# Patient Record
Sex: FEMALE | Race: NATIVE HAWAIIAN OR OTHER PACIFIC ISLANDER
[De-identification: names, ages, dates, MRNs, and addresses within clinical notes are randomized per-mention and may not be internally consistent; named-entity substitution may affect disease eponyms.]

---

## 2018-07-24 ENCOUNTER — HOSPITAL ENCOUNTER (INPATIENT)
Dept: HOSPITAL 31 - C.ER | Age: 83
LOS: 3 days | Discharge: HOME | DRG: 552 | End: 2018-07-27
Attending: INTERNAL MEDICINE | Admitting: INTERNAL MEDICINE
Payer: COMMERCIAL

## 2018-07-24 VITALS — RESPIRATION RATE: 20 BRPM

## 2018-07-24 DIAGNOSIS — I10: ICD-10-CM

## 2018-07-24 DIAGNOSIS — M81.0: ICD-10-CM

## 2018-07-24 DIAGNOSIS — M48.061: ICD-10-CM

## 2018-07-24 DIAGNOSIS — M51.36: Primary | ICD-10-CM

## 2018-07-24 DIAGNOSIS — M48.57XA: ICD-10-CM

## 2018-07-24 DIAGNOSIS — E66.9: ICD-10-CM

## 2018-07-24 DIAGNOSIS — E11.69: ICD-10-CM

## 2018-07-24 LAB
ALBUMIN SERPL-MCNC: 3.7 G/DL (ref 3.5–5)
ALBUMIN/GLOB SERPL: 1.2 {RATIO} (ref 1–2.1)
ALT SERPL-CCNC: 27 U/L (ref 9–52)
APTT BLD: 27 SECONDS (ref 21–34)
AST SERPL-CCNC: 29 U/L (ref 14–36)
BACTERIA #/AREA URNS HPF: (no result) /[HPF]
BASOPHILS # BLD AUTO: 0.1 K/UL (ref 0–0.2)
BASOPHILS NFR BLD: 1.4 % (ref 0–2)
BILIRUB UR-MCNC: NEGATIVE MG/DL
BUN SERPL-MCNC: 17 MG/DL (ref 7–17)
CALCIUM SERPL-MCNC: 9.3 MG/DL (ref 8.6–10.4)
EOSINOPHIL # BLD AUTO: 0.2 K/UL (ref 0–0.7)
EOSINOPHIL NFR BLD: 2.3 % (ref 0–4)
ERYTHROCYTE [DISTWIDTH] IN BLOOD BY AUTOMATED COUNT: 12.8 % (ref 11.5–14.5)
GFR NON-AFRICAN AMERICAN: > 60
GLUCOSE UR STRIP-MCNC: (no result) MG/DL
HGB BLD-MCNC: 11.8 G/DL (ref 11–16)
INR PPP: 1.1
LEUKOCYTE ESTERASE UR-ACNC: (no result) LEU/UL
LYMPHOCYTES # BLD AUTO: 1.5 K/UL (ref 1–4.3)
LYMPHOCYTES NFR BLD AUTO: 22.3 % (ref 20–40)
MCH RBC QN AUTO: 29 PG (ref 27–31)
MCHC RBC AUTO-ENTMCNC: 34.2 G/DL (ref 33–37)
MCV RBC AUTO: 84.9 FL (ref 81–99)
MONOCYTES # BLD: 0.5 K/UL (ref 0–0.8)
MONOCYTES NFR BLD: 7.8 % (ref 0–10)
NEUTROPHILS # BLD: 4.4 K/UL (ref 1.8–7)
NEUTROPHILS NFR BLD AUTO: 66.2 % (ref 50–75)
NRBC BLD AUTO-RTO: 0 % (ref 0–2)
PH UR STRIP: 5 [PH] (ref 5–8)
PLATELET # BLD: 173 K/UL (ref 130–400)
PMV BLD AUTO: 9.3 FL (ref 7.2–11.7)
PROT UR STRIP-MCNC: NEGATIVE MG/DL
PROTHROMBIN TIME: 11.5 SECONDS (ref 9.7–12.2)
RBC # BLD AUTO: 4.06 MIL/UL (ref 3.8–5.2)
RBC # UR STRIP: NEGATIVE /UL
SP GR UR STRIP: 1.03 (ref 1–1.03)
SQUAMOUS EPITHIAL: 8 /HPF (ref 0–5)
UROBILINOGEN UR-MCNC: NORMAL MG/DL (ref 0.2–1)
WBC # BLD AUTO: 6.6 K/UL (ref 4.8–10.8)

## 2018-07-24 RX ADMIN — OXYCODONE HYDROCHLORIDE AND ACETAMINOPHEN PRN TAB: 5; 325 TABLET ORAL at 23:50

## 2018-07-24 NOTE — RAD
PROCEDURE:  Radiographs of the pelvis and bilateral hips



HISTORY:

Right hip pain



COMPARISON:

None.



FINDINGS:



BONES:

The pelvic ring is intact.  There is no acute displaced fracture or 

bone destruction.  There is diffuse bone demineralization. 



JOINTS:

The hip joint spaces are preserved. The sacroiliac joints are normal. 



There is mild osteitis pubis. 



SOFT TISSUES:

There soft tissue calcifications superior to the right greater 

trochanter.  There are multiple phleboliths in the pelvis.  AA the 

rounded calcification in the right hemipelvis likely represents a 

calcified fibroid. 



OTHER FINDINGS:

None.



IMPRESSION:

No acute fracture or dislocation. No significant degenerative 

osteoarthrosis.



Mild osteitis pubis.



Soft tissue calcification superior to the right greater trochanter 

may represent tendon calcification.

## 2018-07-24 NOTE — RAD
Date of service: 



07/24/2018



PROCEDURE:  Radiographs of the Lumbar Spine.



HISTORY:

low back pain







COMPARISON:

Image from barium enema 2/4/2013-frontal view only.



FINDINGS:



BONES:

The facet hypertrophic arthrosis and marginal osteophytosis are 

renoted and have progressed at the L5 and L5-S1 level since the  

image. Diffuse thoraco lumbar segmental spondylosis with intervening 

disc space narrowing is present.  The chronicity of this appearance 

is more difficult to assess at least in part some spondylosis at 

these levels were inferred per  image. There is loss of disc 

height any with mostly anterior  wedging loss of height and 

subchondral sclerosis suggested of the L2 vertebrae.  Although some 

decrease in height is suggested on the prior frontal view - the 

degree of disc height loss greater now than before in the mass 

spondylosis and subchondral sclerosis at this level and above also 

appear greater now. 



An interval compression of the cell to with secondary osteoarthrosis 

consideration of this L2 vertebral body - the subchondral sclerosis 

impart suggest an element of chronicity.  Clinical correlation is 

essential. 



DISC SPACES:

Narrowed as above



OTHER FINDINGS:

Bilateral hemipelvic phleboliths.  A 15 mm calcification projects 

over the right hemipelvis not appreciate on the prior study-however 

the prior study did not include this far inferior portion the pelvis 

scalp EEG. A large phlebolith is 1 consideration. A calculus is 

another. Although a gluteal injection granuloma can't excluded this 

is not appreciated in the gluteal soft tissues on this exam.



The vascular calcifications noted 



Moderate Stool retention.



IMPRESSION:

Compression deformity L2 - the narrowing of this vertebral body 

appears greater than what was present previously on the  the E 

from 2013. The inferior endplate sclerosis and osseous hypertrophic 

changes here encroach on the posterior inferior spinal canal on 

lateral view.  No prior lateral views are available to assess for any 

interval change here. More superiorly no retropulsed ossific 

fragments are suggested. Clinical correlation trans of patient's 

current pain is needed. An acute or subacute on chronic pathology 

here is a consideration.



Interval progressive thoracic lumbar and inferior lumbar level 

hypertrophic arthrosis.



Hemipelvic calcifications as above.



 Stool retention.

## 2018-07-24 NOTE — C.PDOC
History Of Present Illness





82 female, presents with low back pain for last 3 weeks. pain to right lower 

back, going down right leg. saw pmd, given pain meds. pt states remote hx of 

fall, a few weeks ago.no saddle ansetheisa, no hematuria, no urinary changes. 


Time Seen by Provider: 07/24/18 09:02


Chief Complaint (Nursing): Back Pain





Past Medical History


Reviewed: Historical Data, Nursing Documentation, Vital Signs


Vital Signs: 


 Last Vital Signs











Temp  97.8 F   07/24/18 15:48


 


Pulse  76   07/24/18 15:48


 


Resp  20   07/24/18 15:48


 


BP  116/74   07/24/18 15:48


 


Pulse Ox  97   07/24/18 16:27














- Medical History


PMH: HTN, Hypercholesterolemia, Osteoporosis


Family History: States: Unknown Family Hx





- Social History


Hx Alcohol Use: No


Hx Substance Use: No





- Immunization History


Hx Tetanus Toxoid Vaccination:  (unk)


Hx Influenza Vaccination: Yes


Hx Pneumococcal Vaccination:  (unk)





Review Of Systems


Musculoskeletal: Positive for: Back Pain


Neurological: Positive for: Numbness





Physical Exam





- Physical Exam


Appears: Well, No Acute Distress


Skin: Normal Color, Warm, Dry


Eye(s): bilateral: Normal Inspection, PERRL, EOMI


Nose: Normal


Throat: Normal


Neck: Normal


Cardiovascular: Rhythm Regular


Respiratory: Normal Breath Sounds


Gastrointestinal/Abdominal: Normal Exam


Back: Normal Inspection, No Vertebral Tenderness, Paraspinal Tenderness, 

Straight Leg Raising (right)


Extremity: Normal ROM





ED Course And Treatment





- Laboratory Results


Result Diagrams: 


 07/24/18 10:25





 07/24/18 10:25


O2 Sat by Pulse Oximetry: 97 (RA)


Pulse Ox Interpretation: Normal





- Other Rad


  ** XR Lumbar spine


X-Ray: Read By Radiologist


Interpretation: FINDINGS:  BONES:  The facet hypertrophic arthrosis and 

marginal osteophytosis are renoted and have progressed at the L5 and L5-S1 

level since the  image. Diffuse thoraco lumbar segmental spondylosis with 

intervening disc space narrowing is present.  The chronicity of this appearance 

is more difficult to assess at least in part some spondylosis at these levels 

were inferred per  image. There is loss of disc height any with mostly 

anterior  wedging loss of height and subchondral sclerosis suggested of the L2 

vertebrae.  Although some decrease in height is suggested on the prior frontal 

view - the degree of disc height loss greater now than before in the mass 

spondylosis and subchondral sclerosis at this level and above also appear 

greater now.  An interval compression of the cell to with secondary 

osteoarthrosis consideration of this L2 vertebral body - the subchondral 

sclerosis impart suggest an element of chronicity.  Clinical correlation is 

essential.  DISC SPACES:  Narrowed as above.  OTHER FINDINGS:  Bilateral 

hemipelvic phleboliths.  A 15 mm calcification projects over the right 

hemipelvis not appreciate on the prior study-however the prior study did not 

include this far inferior portion the pelvis scalp EEG. A large phlebolith is 1 

consideration. A calculus is another. Although a gluteal injection granuloma can

't excluded this is not appreciated in the gluteal soft tissues on this exam.  

The vascular calcifications noted.  Moderate Stool retention.  IMPRESSION:  

Compression deformity L2 - the narrowing of this vertebral body appears greater 

than what was present previously on the  the E from 2013. The inferior 

endplate sclerosis and osseous hypertrophic changes here encroach on the 

posterior inferior spinal canal on lateral view.  No prior lateral views are 

available to assess for any interval change here. More superiorly no 

retropulsed ossific fragments are suggested. Clinical correlation trans of 

patient's current pain is needed. An acute or subacute on chronic pathology 

here is a consideration.  Interval progressive thoracic lumbar and inferior 

lumbar level hypertrophic arthrosis.  Hemipelvic calcifications as above.  

Stool retention.





Medical Decision Making


Medical Decision Making: 





suspect sciatica. 





1113


XR reviewed, shows compression fracture.


Plan for admission discussed with patient and she is requesting to be admitted 

under Dr. Pruett


Case discussed with Dr. Pruett, who accepts patient under her service. 





Disposition





- Disposition


Disposition: HOSPITALIZED


Disposition Time: 01:00


Condition: STABLE





- Clinical Impression


Clinical Impression: 


 Compression fracture, Low back pain








Decision To Admit





- Pt Status Changed To:


Hospital Disposition Of: Inpatient





- Admit Certification


Admit to Inpatient:: After my assessment, the patient will require 

hospitalization for at least two midnights.  This is because of the severity of 

symptoms shown, intensity of services needed, and/or the medical risk in this 

patient being treated as an outpatient.





- InPatient:


Physician Admission Certification:: needs mri possible kyphoplasty





- .


Bed Request Type: Regular


Admitting Physician: Brenda Pruett


Patient Diagnosis: 


 Compression fracture, Low back pain

## 2018-07-25 RX ADMIN — PREDNISOLONE ACETATE SCH DROP: 10 SUSPENSION/ DROPS OPHTHALMIC at 18:01

## 2018-07-25 RX ADMIN — OXYCODONE HYDROCHLORIDE AND ACETAMINOPHEN PRN TAB: 5; 325 TABLET ORAL at 16:43

## 2018-07-25 RX ADMIN — ROSUVASTATIN CALCIUM SCH MG: 5 TABLET, FILM COATED ORAL at 21:30

## 2018-07-25 RX ADMIN — OXYBUTYNIN CHLORIDE SCH MG: 10 TABLET, EXTENDED RELEASE ORAL at 12:32

## 2018-07-25 RX ADMIN — BRIMONIDINE TARTRATE SCH DROP: 2 SOLUTION/ DROPS OPHTHALMIC at 18:02

## 2018-07-25 RX ADMIN — OXYCODONE HYDROCHLORIDE AND ACETAMINOPHEN PRN TAB: 5; 325 TABLET ORAL at 21:43

## 2018-07-25 RX ADMIN — LATANOPROST SCH ML: 50 SOLUTION OPHTHALMIC at 18:02

## 2018-07-25 RX ADMIN — OXYCODONE HYDROCHLORIDE AND ACETAMINOPHEN PRN TAB: 5; 325 TABLET ORAL at 08:27

## 2018-07-25 RX ADMIN — ENOXAPARIN SODIUM SCH MG: 40 INJECTION SUBCUTANEOUS at 10:33

## 2018-07-25 RX ADMIN — LATANOPROST SCH: 50 SOLUTION OPHTHALMIC at 11:00

## 2018-07-25 RX ADMIN — PREDNISOLONE ACETATE SCH DROP: 10 SUSPENSION/ DROPS OPHTHALMIC at 14:52

## 2018-07-25 NOTE — CP.PCM.PN
Subjective





- Date & Time of Evaluation


Date of Evaluation: 07/25/18


Time of Evaluation: 08:22





- Subjective


Subjective: 





Ortho eval for Dr. Ferrer





82F complains of right sided back pain x 4 weeks with occasional right leg 

pain. She says she has too much pain when she is sitting and worse when she is 

walking. Denies any change in bowel or bladder habits. Denies numbness/

tingling. Patient had fall about 4 weeks ago. No recent trauma or falls. Denies 

groin pain. Pain in leg is down back of leg





Objective





- Vital Signs/Intake and Output


Vital Signs (last 24 hours): 


 











Temp Pulse Resp BP Pulse Ox


 


 97.8 F   76   20   141/77   97 


 


 07/25/18 08:00  07/25/18 08:00  07/25/18 08:00  07/25/18 08:00  07/25/18 08:00











- Medications


Medications: 


 Current Medications





Aspirin (Aspirin Chewable)  81 mg PO DAILY [facility]


Enoxaparin Sodium (Lovenox)  40 mg SC DAILY [facility]


Ergocalciferol (Drisdol 50,000 Intl Units Cap)  1 cap PO QWK [facility]


Glimepiride (Amaryl)  4 mg PO DAILY [facility]


Home Med (Alendronate [Fosamax])  70 mg PO QWK [facility]


Home Med (Brimonidine Tartrate [Alphagan P 0.1 % Ophth])  5 ml OP BID [facility]


Home Med (Trifluoperazine Hcl [Stelazine])  5 mg PO DAILY [facility]


Latanoprost (Xalatan Opht)  0.05 ml OU BID [facility]


Losartan Potassium (Cozaar)  100 mg PO DAILY [facility]


Oxycodone/Acetaminophen (Percocet 5/325 Mg Tab)  1 tab PO Q6H PRN


   PRN Reason: Pain, moderate (4-7)


   Stop: 07/27/18 22:29


   Last Admin: 07/24/18 23:50 Dose:  1 tab


Rosuvastatin Calcium (Crestor)  2.5 mg PO HS [facility]


Sitagliptin Phosphate (Januvia)  50 mg PO BID [facility]











- Labs


Labs: 


 





 07/24/18 10:25 





 07/24/18 10:25 





 











PT  11.5 SECONDS (9.7-12.2)   07/24/18  10:25    


 


INR  1.1   07/24/18  10:25    


 


APTT  27 SECONDS (21-34)   07/24/18  10:25    














- Constitutional


Appears: Well, No Acute Distress (sitting on edge of bed)





- Neck Exam


Neck Exam: Full ROM, Normal Inspection





- Respiratory Exam


Respiratory Exam: NORMAL BREATHING PATTERN





- Cardiovascular Exam


Additional comments: 





+DP/PT pulses





- Extremities Exam


Additional comments: 





calves soft NT neg homans


sensation intact BLE


1+patellar reflexes B





- Back Exam


Additional comments: 





tenderness to right gluteal area


no bony tenderness





- Neurological Exam


Neurological Exam: Alert, Awake


Neuro motor strength exam: Left Lower Extremity: 5, Right Lower Extremity: 5 (

great toe ext, DF/PF/knee flex/ext)





- Psychiatric Exam


Psychiatric exam: Normal Affect, Normal Mood





- Skin


Skin Exam: Dry, Intact, Normal Color, Warm





Assessment and Plan


(1) Compression fracture of L2


Assessment & Plan: 


MRI lumbar spine


sclerosis noted to L2, unclear if acute or chronic fracture, noted 

spondylolisthesis


noted multilevel facet joint DJD


recommend VTE proph


recommend neurosurgical consult prior to PT/OT to determine if surgical 

indication or if unstable, Dr. Ferrer agrees 


lidoderm patch


d/w Dr. Ferrer, agrees with above


Status: Acute   





(2) Lumbosacral spondylosis with radiculopathy


Status: Acute   





Past Patient History





- Past Medical History & Family History


Past Medical History?: Yes





- Past Social History


Smoking Status: Never Smoked





- CARDIAC


Hx Cardiac Disorders: Yes


Hx Hypercholesterolemia: Yes


Hx Hypertension: Yes





- ENDOCRINE/METABOLIC


Hx Endocrine Disorders: Yes


Hx Diabetes Mellitus Type 2: Yes





- MUSCULOSKELETAL/RHEUMATOLOGICAL


Hx Musculoskeletal Disorders: Yes


Hx Falls: Yes (slip from chair to floor 3 mo. ago)


Hx Osteoporosis: Yes





- PSYCHIATRIC


Hx Psychophysiologic Disorder: No


Hx Substance Use: No





- SURGICAL HISTORY


Hx Surgeries: No





- ANESTHESIA


Hx Anesthesia: No





Radiology Interpretation





- Radiology Interpretation #2


Interpretation: 





Patient Name / ID : ZURI CHRISTIE K  / [id number]


Exam Date : 07/24/2018 09:19:28 ( Approved )


Study Comment : 


Sex / Age : F  / 82Y





Creator : Caldwell-Lombardi, Kathleen


Dictator : Caldwell-Lombardi, Kathleen


 : 


Approver : Caldwell-Lombardi, Kathleen


Approver2 : 





Report Date : 07/24/2018 10:26:20


My Comment : 


********************************************************************************

***





Date of service: 





07/24/2018





PROCEDURE:  Radiographs of the Lumbar Spine.





HISTORY:


low back pain











COMPARISON:


Image from barium enema 2/4/2013-frontal view only.





FINDINGS:





BONES:


The facet hypertrophic arthrosis and marginal osteophytosis are renoted and 

have progressed at the L5 and L5-S1 level since the  image. Diffuse 

thoraco lumbar segmental spondylosis with intervening disc space narrowing is 

present.  The chronicity of this appearance is more difficult to assess at 

least in part some spondylosis at these levels were inferred per  image. 

There is loss of disc height any with mostly anterior  wedging loss of height 

and subchondral sclerosis suggested of the L2 vertebrae.  Although some 

decrease in height is suggested on the prior frontal view - the degree of disc 

height loss greater now than before in the mass spondylosis and subchondral 

sclerosis at this level and above also appear greater now. 





An interval compression of the cell to with secondary osteoarthrosis 

consideration of this L2 vertebral body - the subchondral sclerosis impart 

suggest an element of chronicity.  Clinical correlation is essential. 





DISC SPACES:


Narrowed as above





OTHER FINDINGS:


Bilateral hemipelvic phleboliths.  A 15 mm calcification projects over the 

right hemipelvis not appreciate on the prior study-however the prior study did 

not include this far inferior portion the pelvis scalp EEG. A large phlebolith 

is 1 consideration. A calculus is another. Although a gluteal injection 

granuloma can't excluded this is not appreciated in the gluteal soft tissues on 

this exam.





The vascular calcifications noted 





Moderate Stool retention.





IMPRESSION:


Compression deformity L2 - the narrowing of this vertebral body appears greater 

than what was present previously on the  the E from 2013. The inferior 

endplate sclerosis and osseous hypertrophic changes here encroach on the 

posterior inferior spinal canal on lateral view.  No prior lateral views are 

available to assess for any interval change here. More superiorly no 

retropulsed ossific fragments are suggested. Clinical correlation trans of 

patient's current pain is needed. An acute or subacute on chronic pathology 

here is a consideration.





Interval progressive thoracic lumbar and inferior lumbar level hypertrophic 

arthrosis.





Hemipelvic calcifications as above.





 Stool retention.




















- Radiology Interpretation #3


Interpretation: 





Patient Name / ID : ZURI CHRISTIE K  / [id number]


Exam Date : 07/24/2018 11:41:45 ( Approved )


Study Comment : 


Sex / Age : F  / 82Y





Creator : Jennifer Peterson MD


Dictator : Jennifer Peterson MD


 : 


Approver : Jennifer Peterson MD


Approver2 : 





Report Date : 07/24/2018 12:45:23


My Comment : 


********************************************************************************

***





PROCEDURE:  Radiographs of the pelvis and bilateral hips





HISTORY:


Right hip pain





COMPARISON:


None.





FINDINGS:





BONES:


The pelvic ring is intact.  There is no acute displaced fracture or bone 

destruction.  There is diffuse bone demineralization. 





JOINTS:


The hip joint spaces are preserved. The sacroiliac joints are normal. 





There is mild osteitis pubis. 





SOFT TISSUES:


There soft tissue calcifications superior to the right greater trochanter.  

There are multiple phleboliths in the pelvis.  AA the rounded calcification in 

the right hemipelvis likely represents a calcified fibroid. 





OTHER FINDINGS:


None.





IMPRESSION:


No acute fracture or dislocation. No significant degenerative osteoarthrosis.





Mild osteitis pubis.





Soft tissue calcification superior to the right greater trochanter may 

represent tendon calcification.

## 2018-07-25 NOTE — MRI
Date of service: 



07/25/2018



PROCEDURE:  MR LUMBAR SPINE WITHOUT CONTRAST



HISTORY:

L2 compression fx, LBP r/o RLE radiculopathy



COMPARISON:

None available. 



TECHNIQUE:

Multiecho multiplanar sequences were performed through the lumbar 

spine without the use of intravenous contrast.



FINDINGS:

Normal lumbar curvature is mildly interrupted by a grade 1 

spondylolisthesis at L2-3 apparently on the basis of bilateral L3 

spondylolysis. There is also moderate anterior wedging of the L2 

vertebral body without edema indicating chronic fracture here. No 

suspicious matter signal changes are seen throughout the exam. 



Gross diffuse desiccation is seen throughout the intervertebral discs 

including those at the visualized inferior thoracic spine with conus 

medullaris appears normal in signal terminating at L1 with 

prevertebral paraspinal soft tissues appearing diffusely 

unremarkable. 



T12-L1:

No disc herniation, spinal canal stenosis or neural foraminal 

narrowing. 



L1-2:

No disc herniation, spinal canal stenosis or neural foraminal 

narrowing. 



L2-3:

No disc herniation.  Spondylolisthesis and facet joint arthropathy 

results in mild central canal stenosis and severe left neural 

foraminal stenosis with mild-to-moderate right neural foraminal 

stenosis present. Asymmetry in spondylolisthesis results in asymmetry 

in foraminal stenosis. 



L3-4:

No disc herniation, spinal canal stenosis or neural foraminal 

narrowing. A small right facet joint synovial cyst encroaches right 

dorsal nerve roots minimally. 



L4-5:

No disc herniation, spinal canal stenosis or neural foraminal 

narrowing. 



L5-S1:

A minimal generalized disc bulge combines with gross facet joint 

degenerative change resulting in severe central canal stenosis and 

borderline bilateral neural foraminal stenosis. No disc herniation. 



OTHER FINDINGS:

None. 



IMPRESSION:

1. Chronic moderate L2 compression fracture. 



2. Grade 1 spondylolisthesis L2-3 appears on the basis of L3 

bilateral spondylolysis. A mild central canal stenosis results with 

severe left and mild-to-moderate right degenerative neural foraminal 

stenosis. No definitive disc herniation. 



3. Severe degenerative central canal stenosis L5-S1.

## 2018-07-25 NOTE — CP.PCM.PN
Subjective





- Date & Time of Evaluation


Date of Evaluation: 07/25/18


Time of Evaluation: 10:49





- Subjective


Subjective: 





still has alot of pain back had mri don today will need neurosurgery evaluation





Objective





- Vital Signs/Intake and Output


Vital Signs (last 24 hours): 


 











Temp Pulse Resp BP Pulse Ox


 


 97.8 F   76   20   141/77   97 


 


 07/25/18 08:00  07/25/18 08:00  07/25/18 08:00  07/25/18 08:00  07/25/18 08:00











- Medications


Medications: 


 Current Medications





Aspirin (Aspirin Chewable)  81 mg PO DAILY [facility]


   Last Admin: 07/25/18 10:32 Dose:  81 mg


Enoxaparin Sodium (Lovenox)  40 mg SC DAILY [facility]


   Last Admin: 07/25/18 10:33 Dose:  40 mg


Ergocalciferol (Drisdol 50,000 Intl Units Cap)  1 cap PO QWK [facility]


   Last Admin: 07/25/18 10:37 Dose:  1 cap


Glimepiride (Amaryl)  4 mg PO DAILY [facility]


   Last Admin: 07/25/18 10:33 Dose:  4 mg


Home Med (Alendronate [Fosamax])  70 mg PO QWK [facility]


Home Med (Brimonidine Tartrate [Alphagan P 0.1 % Ophth])  5 ml OP BID [facility]


Home Med (Trifluoperazine Hcl [Stelazine])  5 mg PO DAILY [facility]


Home Med (Trihexyphenidyl Hcl [Trihexyphenidyl Hcl])  5 mg PO DAILY [facility]


Latanoprost (Xalatan Opht)  0.05 ml OU BID [facility]


Lidocaine (Lidoderm)  1 ea TD DAILY [facility]


   Last Admin: 07/25/18 10:33 Dose:  1 ea


Losartan Potassium (Cozaar)  100 mg PO DAILY [facility]


   Last Admin: 07/25/18 10:32 Dose:  100 mg


Oxybutynin Chloride (Ditropan Xl)  10 mg PO DAILY [facility]


Oxycodone/Acetaminophen (Percocet 5/325 Mg Tab)  1 tab PO Q6H PRN


   PRN Reason: Pain, moderate (4-7)


   Stop: 07/27/18 22:29


   Last Admin: 07/25/18 08:27 Dose:  1 tab


Rosuvastatin Calcium (Crestor)  2.5 mg PO HS [facility]


Sitagliptin Phosphate (Januvia)  50 mg PO BID [facility]


   Last Admin: 07/25/18 10:33 Dose:  50 mg











- Labs


Labs: 


 





 07/24/18 10:25 





 07/24/18 10:25 





 











PT  11.5 SECONDS (9.7-12.2)   07/24/18  10:25    


 


INR  1.1   07/24/18  10:25    


 


APTT  27 SECONDS (21-34)   07/24/18  10:25    














- Constitutional


Appears: Non-toxic





- Head Exam


Head Exam: NORMAL INSPECTION





- Eye Exam


Eye Exam: Normal appearance


Pupil Exam: NORMAL ACCOMODATION





- ENT Exam


ENT Exam: Mucous Membranes Moist





- Respiratory Exam


Respiratory Exam: Clear to Ausculation Bilateral





- Cardiovascular Exam


Cardiovascular Exam: REGULAR RHYTHM





- GI/Abdominal Exam


GI & Abdominal Exam: Normal Bowel Sounds





- Extremities Exam


Extremities Exam: Normal Inspection





- Back Exam


Back Exam: CVA tenderness (L), CVA tenderness (R), tenderness





- Neurological Exam


Neurological Exam: Alert, Awake, Normal Gait, Oriented x3





- Psychiatric Exam


Psychiatric exam: Normal Affect





- Skin


Skin Exam: Normal Color





Assessment and Plan





- Assessment and Plan (Free Text)


Assessment: 





severe back pain compresion fx  


Plan: 





as per orthopedic need neuro surgery evaluation

## 2018-07-26 RX ADMIN — PREDNISOLONE ACETATE SCH DROP: 10 SUSPENSION/ DROPS OPHTHALMIC at 09:58

## 2018-07-26 RX ADMIN — OXYBUTYNIN CHLORIDE SCH MG: 10 TABLET, EXTENDED RELEASE ORAL at 09:57

## 2018-07-26 RX ADMIN — LATANOPROST SCH ML: 50 SOLUTION OPHTHALMIC at 09:58

## 2018-07-26 RX ADMIN — PREDNISOLONE ACETATE SCH DROP: 10 SUSPENSION/ DROPS OPHTHALMIC at 18:26

## 2018-07-26 RX ADMIN — OXYCODONE HYDROCHLORIDE AND ACETAMINOPHEN PRN TAB: 5; 325 TABLET ORAL at 14:48

## 2018-07-26 RX ADMIN — BRIMONIDINE TARTRATE SCH DROP: 2 SOLUTION/ DROPS OPHTHALMIC at 09:59

## 2018-07-26 RX ADMIN — PREDNISOLONE ACETATE SCH DROP: 10 SUSPENSION/ DROPS OPHTHALMIC at 14:07

## 2018-07-26 RX ADMIN — OXYCODONE HYDROCHLORIDE AND ACETAMINOPHEN PRN TAB: 5; 325 TABLET ORAL at 04:43

## 2018-07-26 RX ADMIN — LATANOPROST SCH ML: 50 SOLUTION OPHTHALMIC at 18:26

## 2018-07-26 RX ADMIN — BRIMONIDINE TARTRATE SCH DROP: 2 SOLUTION/ DROPS OPHTHALMIC at 18:26

## 2018-07-26 RX ADMIN — ENOXAPARIN SODIUM SCH MG: 40 INJECTION SUBCUTANEOUS at 09:56

## 2018-07-26 RX ADMIN — ROSUVASTATIN CALCIUM SCH MG: 5 TABLET, FILM COATED ORAL at 21:30

## 2018-07-26 NOTE — CP.PCM.PN
Subjective





- Date & Time of Evaluation


Date of Evaluation: 07/26/18


Time of Evaluation: 13:55





- Subjective


Subjective: 





Patient states her back pain is getting better. She says she still has pain





Review of Systems





- Review of Systems


All systems: reviewed and no additional remarkable complaints except





- Cardiovascular


Cardiovascular: UNREMARKABLE





- Respiratory


Respiratory: UNREMARKABLE





- Musculoskeletal


Musculoskeletal: As Par HPI





- Integumentary


Integumentary: UNREMARKABLE





- Neurological


Neurological: UNREMARKABLE





- Hematologic/Lymphatic


Hematologic: UNREMARKABLE





Objective





- Vital Signs/Intake and Output


Vital Signs (last 24 hours): 


 











Temp Pulse Resp BP Pulse Ox


 


 97.9 F   87   20   121/78   95 


 


 07/26/18 07:28  07/26/18 07:28  07/26/18 07:28  07/26/18 07:28  07/26/18 07:28








Intake and Output: 


 











 07/26/18 07/26/18





 06:59 18:59


 


Intake Total 680 


 


Balance 680 














- Medications


Medications: 


 Current Medications





Aspirin (Aspirin Chewable)  81 mg PO DAILY [facility]


   Last Admin: 07/26/18 09:56 Dose:  81 mg


Brimonidine Tartrate (Alphagan 0.2% Opht)  0 ml OU BID [facility]


   Last Admin: 07/26/18 09:59 Dose:  1 drop


Enoxaparin Sodium (Lovenox)  40 mg SC DAILY [facility]


   Last Admin: 07/26/18 09:56 Dose:  40 mg


Ergocalciferol (Drisdol 50,000 Intl Units Cap)  1 cap PO QWK [facility]


   Last Admin: 07/25/18 10:37 Dose:  1 cap


Glimepiride (Amaryl)  4 mg PO DAILY [facility]


   Last Admin: 07/26/18 09:56 Dose:  4 mg


Home Med (Alendronate [Fosamax])  70 mg PO QWK [facility]


Home Med (Trifluoperazine Hcl [Stelazine])  5 mg PO DAILY [facility]


Latanoprost (Xalatan Opht)  0 ml OU BID [facility]


   Last Admin: 07/26/18 09:58 Dose:  2.5 ml


Lidocaine (Lidoderm)  1 ea TD DAILY [facility]


   Last Admin: 07/26/18 09:58 Dose:  1 ea


Losartan Potassium (Cozaar)  100 mg PO DAILY [facility]


   Last Admin: 07/26/18 09:57 Dose:  100 mg


Oxybutynin Chloride (Ditropan Xl)  10 mg PO DAILY [facility]


   Last Admin: 07/26/18 09:57 Dose:  10 mg


Oxycodone/Acetaminophen (Percocet 5/325 Mg Tab)  1 tab PO Q6H PRN


   PRN Reason: Pain, moderate (4-7)


   Stop: 07/27/18 22:29


   Last Admin: 07/26/18 04:43 Dose:  1 tab


Prednisolone Acetate (Pred Forte 1% Opht Susp)  0 ml OS TID [facility]


   Last Admin: 07/26/18 09:58 Dose:  1 drop


Rosuvastatin Calcium (Crestor)  2.5 mg PO HS [facility]


   Last Admin: 07/25/18 21:30 Dose:  2.5 mg


Sitagliptin Phosphate (Januvia)  50 mg PO BID [facility]


   Last Admin: 07/26/18 09:57 Dose:  50 mg


Trihexyphenidyl HCl (Artane)  5 mg PO DAILY [facility]











- Labs


Labs: 


 





 07/24/18 10:25 





 07/24/18 10:25 





 











PT  11.5 SECONDS (9.7-12.2)   07/24/18  10:25    


 


INR  1.1   07/24/18  10:25    


 


APTT  27 SECONDS (21-34)   07/24/18  10:25    














- Constitutional


Appears: Well, No Acute Distress (sitting on edge of bed eating)





- Extremities Exam


Additional comments: 





sensation intact BLE


 to R gluteal area





- Neurological Exam


Neurological Exam: Alert, Awake, Oriented x3


Neuro motor strength exam: Left Lower Extremity: 5, Right Lower Extremity: 5





- Psychiatric Exam


Psychiatric exam: Normal Affect, Normal Mood





- Skin


Skin Exam: Dry, Intact, Normal Color, Warm





Assessment and Plan


(1) Compression fracture of L2


Assessment & Plan: 


spine consult pending


no orthopedic intervention indicated


PT note appreciated


d/w Dr. Ferrer, agrees with above


Status: Acute   





(2) Lumbosacral spondylosis with radiculopathy


Status: Acute

## 2018-07-26 NOTE — CP.PCM.PN
Subjective





- Date & Time of Evaluation


Date of Evaluation: 07/26/18


Time of Evaluation: 17:07





- Subjective


Subjective: 





pt still has paoin back seen today by nerosurgery ct don severe disc herniation





Objective





- Vital Signs/Intake and Output


Vital Signs (last 24 hours): 


 











Temp Pulse Resp BP Pulse Ox


 


 97.6 F   78   20   108/68   97 


 


 07/26/18 15:54  07/26/18 15:54  07/26/18 15:54  07/26/18 15:54  07/26/18 15:54








Intake and Output: 


 











 07/26/18 07/26/18





 06:59 18:59


 


Intake Total 680 300


 


Balance 680 300














- Medications


Medications: 


 Current Medications





Aspirin (Aspirin Chewable)  81 mg PO DAILY [facility]


   Last Admin: 07/26/18 09:56 Dose:  81 mg


Brimonidine Tartrate (Alphagan 0.2% Opht)  0 ml OU BID [facility]


   Last Admin: 07/26/18 09:59 Dose:  1 drop


Enoxaparin Sodium (Lovenox)  40 mg SC DAILY [facility]


   Last Admin: 07/26/18 09:56 Dose:  40 mg


Ergocalciferol (Drisdol 50,000 Intl Units Cap)  1 cap PO QWK [facility]


   Last Admin: 07/25/18 10:37 Dose:  1 cap


Glimepiride (Amaryl)  4 mg PO DAILY [facility]


   Last Admin: 07/26/18 09:56 Dose:  4 mg


Home Med (Alendronate [Fosamax])  70 mg PO QWK [facility]


Home Med (Trifluoperazine Hcl [Stelazine])  5 mg PO DAILY [facility]


Latanoprost (Xalatan Opht)  0 ml OU BID [facility]


   Last Admin: 07/26/18 09:58 Dose:  2.5 ml


Lidocaine (Lidoderm)  1 ea TD DAILY [facility]


   Last Admin: 07/26/18 09:58 Dose:  1 ea


Losartan Potassium (Cozaar)  100 mg PO DAILY [facility]


   Last Admin: 07/26/18 09:57 Dose:  100 mg


Oxybutynin Chloride (Ditropan Xl)  10 mg PO DAILY [facility]


   Last Admin: 07/26/18 09:57 Dose:  10 mg


Oxycodone/Acetaminophen (Percocet 5/325 Mg Tab)  1 tab PO Q6H PRN


   PRN Reason: Pain, moderate (4-7)


   Stop: 07/27/18 22:29


   Last Admin: 07/26/18 14:48 Dose:  1 tab


Prednisolone Acetate (Pred Forte 1% Opht Susp)  0 ml OS TID [facility]


   Last Admin: 07/26/18 14:07 Dose:  1 drop


Rosuvastatin Calcium (Crestor)  2.5 mg PO HS [facility]


   Last Admin: 07/25/18 21:30 Dose:  2.5 mg


Sitagliptin Phosphate (Januvia)  50 mg PO BID [facility]


   Last Admin: 07/26/18 09:57 Dose:  50 mg


Trihexyphenidyl HCl (Artane)  5 mg PO DAILY [facility]











- Labs


Labs: 


 





 07/24/18 10:25 





 07/24/18 10:25 





 











PT  11.5 SECONDS (9.7-12.2)   07/24/18  10:25    


 


INR  1.1   07/24/18  10:25    


 


APTT  27 SECONDS (21-34)   07/24/18  10:25    














- Constitutional


Appears: Non-toxic





- Head Exam


Head Exam: NORMAL INSPECTION





- Eye Exam


Eye Exam: Normal appearance


Pupil Exam: NORMAL ACCOMODATION





- ENT Exam


ENT Exam: Mucous Membranes Moist





- Neck Exam


Neck Exam: Full ROM





- Respiratory Exam


Respiratory Exam: Clear to Ausculation Bilateral





- Cardiovascular Exam


Cardiovascular Exam: REGULAR RHYTHM





- GI/Abdominal Exam


GI & Abdominal Exam: Normal Bowel Sounds





- Rectal Exam


Rectal Exam: Deferred





- Extremities Exam


Extremities Exam: Normal Capillary Refill





- Back Exam


Back Exam: CVA tenderness (L)





- Neurological Exam


Neurological Exam: Abnormal Gait, Alert, Oriented x3





- Psychiatric Exam


Psychiatric exam: Normal Affect





- Skin


Skin Exam: Normal Color





Assessment and Plan





- Assessment and Plan (Free Text)


Assessment: 





severe back pain disc herniation spinal stenosis


Plan: 





will dicuss with neurosurgean if any surgery needed will keep pt if not 

willdischarge with pt

## 2018-07-26 NOTE — CT
Date of service: 



07/26/2018



PROCEDURE:  CT Lumbar Spine without contrast



HISTORY:

Old fx L2 with retrolisthesis



COMPARISON:

MRI lumbar spine from 07/25/2018.



TECHNIQUE:

Axial computed tomography images were obtained of the lumbar spine 

without the use of intravenous contrast. Coronal and sagittal 

reformatted images were created and reviewed. 



Radiation dose:



Total exam DLP = 2010.32 mGy-cm.



This CT exam was performed using one or more of the following dose 

reduction techniques: Automated exposure control, adjustment of the 

mA and/or kV according to patient size, and/or use of iterative 

reconstruction technique.



FINDINGS:



VERTEBRAE:

There is mild dextrocurvature in the lumbar spine. There is diffuse 

bone demineralization. There redemonstration of chronic inferior 

endplate osteoporotic compression fracture deformity in the L2 

vertebral body with approximately 50 percent loss of anterior 

vertebral height and left posterior inferior retropulsion of fracture 

fragment. There is normal lumbar lordosis. There is no acute fracture 

or spondylolysis. 



DISCS/SPINAL CANAL/NEURAL FORAMINA:

Evaluation of the discs and nerve roots of spinal canal is limited on 

noncontrast CT examination. Allowing for this: 



L1-2:    No large disc herniation, neural foraminal or spinal canal 

stenosis.



L2-3:    The retropulsed posterior inferior fragment on the left in 

conjunction with moderate facet arthropathy result in severe left 

neural foraminal narrowing. On the right posterior osteophyte in 

conjunction with mild facet arthropathy contribute to moderate right 

neural foraminal narrowing. No central spinal canal stenosis. 



L3-4:    No large disc herniation, neural foraminal or spinal canal 

stenosis. 



L4-5:    Diffuse posterior disc bulge in conjunction with mild 

ligamentum flavum infolding result in mild spinal canal stenosis.  

Moderate bilateral facet arthropathy contribute to mild right and 

moderate left neural foraminal narrowing.



L5-S1:  Diffuse posterior disc bulge with superimposed right 

posterolateral and foraminal disc protrusions in conjunction with 

severe facet arthropathy result in severe right neural foraminal 

narrowing.  Also noted is mild spinal canal stenosis.



PARASPINAL SOFT TISSUES:

There is mild fatty atrophy of the paraspinous muscles. 



OTHER FINDINGS:

None. 



IMPRESSION:

1. 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 

vertebral body with approximately 50% loss of anterior vertebral 

height and leftward posterior inferior retropulsion of fracture 

fragment. 



2. Multilevel degenerative disc disease, worse at L2-3 with left 

sided posterior inferior retropulsion of fracture fragment which in 

conjunction with moderate facet arthropathy result in severe left 

neural foraminal narrowing. On the right posterior osteophyte in 

conjunction with mild facet arthropathy contribute to moderate right 

neural foraminal narrowing. No central spinal canal stenosis.



3. Additional comments as described above.

## 2018-07-26 NOTE — CON
DATE:  07/25/2018



HISTORY OF PRESENT ILLNESS:  This is an 82-year-old female with complaining

of lower back pain.  The pain radiates into the right lower extremity.  She

gives history of sustaining a fall several months ago.  For the past four

weeks, she has been experiencing more pain.  The initial fall was about

seven months ago.  She does not give any history of weakness or any

problems with the bowel or bladder.  Does not give any history of numbness.



PHYSICAL EXAMINATION:

GENERAL:  Revealed an elderly female with complaints of lower back pain.

MUSCULOSKELETAL:  Tenderness on L4 to S1 noted.  Range of motion of the

back is painful.  Moderate spasm noted.  Bilateral sciatic notch tenderness

noted.  Straight leg raising test is positive on the right side at 35

degrees.  Crossed straight leg raising testing is negative.  Range of

motion of the hip is painless.



DIAGNOSTIC DATA:  X-rays of the lumbar spine reveal evidence of compression

fracture of the L2 vertebra.  Some chronicity is noted in the fracture. 

X-rays of the right hip is within normal limits.  The range of motion of

the hip is painless.  The patient has been sitting in a chair when I

examined her.



She had an MRI of the lumbar spine performed on 07/25/2018.  MRI is

reporting chronic moderate L2 compression fracture and a grade I

spondylolisthesis of L2-L3.  Mild central canal stenosis is also seen. 

Also seen severe degenerative central canal stenosis at L5-S1.



DIAGNOSES:

1.  Degenerative disk disease of the lumbar spine.

2.  Lumbar spinal stenosis.

3.  Compression fracture of the L2 vertebra.



PLAN:  Treatment at this point is symptomatic.  We will start the patient

on physical therapy and pain medication as needed.  The patient was given

followup instructions.





__________________________________________

Mabel Ferrer MD





DD:  07/25/2018 14:58:35

DT:  07/25/2018 15:51:15

Job # [id number]

## 2018-07-27 VITALS — SYSTOLIC BLOOD PRESSURE: 137 MMHG | DIASTOLIC BLOOD PRESSURE: 83 MMHG | HEART RATE: 89 BPM | TEMPERATURE: 98.2 F

## 2018-07-27 VITALS — OXYGEN SATURATION: 96 %

## 2018-07-27 RX ADMIN — PREDNISOLONE ACETATE SCH DROP: 10 SUSPENSION/ DROPS OPHTHALMIC at 13:22

## 2018-07-27 RX ADMIN — ENOXAPARIN SODIUM SCH MG: 40 INJECTION SUBCUTANEOUS at 10:17

## 2018-07-27 RX ADMIN — OXYCODONE HYDROCHLORIDE AND ACETAMINOPHEN PRN TAB: 5; 325 TABLET ORAL at 02:22

## 2018-07-27 RX ADMIN — PREDNISOLONE ACETATE SCH DROP: 10 SUSPENSION/ DROPS OPHTHALMIC at 10:18

## 2018-07-27 RX ADMIN — BRIMONIDINE TARTRATE SCH DROP: 2 SOLUTION/ DROPS OPHTHALMIC at 10:20

## 2018-07-27 RX ADMIN — LATANOPROST SCH ML: 50 SOLUTION OPHTHALMIC at 10:20

## 2018-07-27 RX ADMIN — OXYBUTYNIN CHLORIDE SCH MG: 10 TABLET, EXTENDED RELEASE ORAL at 10:17

## 2018-07-27 NOTE — CON
DATE:  07/26/2018



REASON FOR CONSULTATION:  Evaluation of fracture, L2, and right buttock

pain.



HISTORY OF PRESENT ILLNESS:  The patient is one week shy of being 83 and

states through her family that she had a fall 6 to 7 months ago.  At that

time, she was leaning over in a chair, trying to tie her shoe or do

something and fell forward.  No significant pain at that time.  She states

she has been fully ambulatory.  However a month ago, she began to get pain

in the right buttock.  She denies any pain centrally in her back at all. 

She states occasionally she has symptom in her leg, but basically it is

just the right buttock area that hurts.  No loss of bowel or bladder

control.  She has not had any active treatments for this.  Evidently,

taking ibuprofen had seem to help in the past.  She was admitted because of

her continued pain, and the MRI done during her stay demonstrated an old

fracture of L2.



PAST MEDICAL HISTORY:  Significant for hypertension, hypercholesterolemia,

type 2 diabetes, noninsulin-dependent.



MEDICATIONS:  As listed on the chart.



ALLERGIES:  NO KNOWN ALLERGIES.



PAST SURGICAL HISTORY:  Evidently, she had some surgery done on her right

arm 30 years ago or so.



PHYSICAL EXAMINATION:

GENERAL:  She has no tenderness to palpation throughout the entire lumbar

spine.  She is tender around the right sciatic notch.  Nothing really on

the left side.  She has good rotation of both hips with no complaints of

pain whatsoever.  Negative straight leg raising bilaterally.  She states

her sensation is intact to light touch throughout both legs.  She has

excellent motor strength as well.  Decent distal pulses.



LABORATORY DATA:  Her MRI shows an old fracture of the inferior anterior

part of the L2 body.  She has some retrolisthesis there leading to spinal

stenosis.  She has a fair amount of facet hypertrophy and thickening of the

ligament flavum in the levels below leading to stenotic condition as well. 

However, again, no increased bone marrow as a signal to indicate any acuity

of anything here.



I would like to get a CAT scan just to evaluate if there is any bony

healing or stability at L2-3 level where she has the retrolisthesis. 

Certainly, looking at the MRI, I think she would benefit from at least the

decompressive laminectomy, possible stabilization of that L2-3 level, but

again there is a chance that this is very old and it is hard to say how

long she is being living with this.  Without any significant limitations

and ambulation or leg pains, again, it is hard to make a case to proceed

with surgical intervention at this time.  I am going to order some physical

therapy and see if that can help her.  If they are feeling that she can be

discharged and receive outpatient therapy, that is fine.  We will follow up

with her in the office.  If she fails to improve, then she may be candidate

for 1 or 2 epidural injections to see if that helps quiet things down.  If

none of that works, then we will have another discussion about doing a

laminectomy, but again would just take one step at time.



Thank you for allowing me to participate in the care of your patient.





__________________________________________

Bobby Caceres MD



DD:  07/26/2018 10:08:41

DT:  07/26/2018 10:47:23

Job # [id number]

## 2018-07-27 NOTE — CP.PCM.PN
Subjective





- Date & Time of Evaluation


Date of Evaluation: 07/27/18


Time of Evaluation: 10:06





- Subjective


Subjective: 





still has back pain positive ct and mri no surgery at this time will try med 

and pt 





Objective





- Vital Signs/Intake and Output


Vital Signs (last 24 hours): 


 











Temp Pulse Resp BP Pulse Ox


 


 98.2 F   89   20   137/83   96 


 


 07/27/18 07:35  07/27/18 07:35  07/27/18 07:35  07/27/18 07:35  07/27/18 07:35











- Medications


Medications: 


 Current Medications





Aspirin (Aspirin Chewable)  81 mg PO DAILY [facility]


   Last Admin: 07/26/18 09:56 Dose:  81 mg


Brimonidine Tartrate (Alphagan 0.2% Opht)  0 ml OU BID [facility]


   Last Admin: 07/26/18 18:26 Dose:  1 drop


Chlorpromazine (Thorazine)  50 mg PO DAILY [facility]


Enoxaparin Sodium (Lovenox)  40 mg SC DAILY [facility]


   Last Admin: 07/26/18 09:56 Dose:  40 mg


Ergocalciferol (Drisdol 50,000 Intl Units Cap)  1 cap PO QWK [facility]


   Last Admin: 07/25/18 10:37 Dose:  1 cap


Glimepiride (Amaryl)  4 mg PO DAILY [facility]


   Last Admin: 07/26/18 09:56 Dose:  4 mg


Home Med (Patient's Own Medication)  1 tab PO QWK [facility]


Home Med (Trifluoperazine Hcl [Stelazine])  5 mg PO DAILY [facility]


Home Med (Patient's Own Medication)  1 tab PO DAILY [facility]


Latanoprost (Xalatan Opht)  0 ml OU BID [facility]


   Last Admin: 07/26/18 18:26 Dose:  2.5 ml


Lidocaine (Lidoderm)  1 ea TD DAILY [facility]


   Last Admin: 07/26/18 09:58 Dose:  1 ea


Losartan Potassium (Cozaar)  100 mg PO DAILY [facility]


   Last Admin: 07/26/18 09:57 Dose:  100 mg


Oxybutynin Chloride (Ditropan Xl)  10 mg PO DAILY [facility]


   Last Admin: 07/26/18 09:57 Dose:  10 mg


Oxycodone/Acetaminophen (Percocet 5/325 Mg Tab)  1 tab PO Q6H PRN


   PRN Reason: Pain, moderate (4-7)


   Stop: 07/27/18 22:29


   Last Admin: 07/27/18 02:22 Dose:  1 tab


Prednisolone Acetate (Pred Forte 1% Opht Susp)  0 ml OS TID [facility]


   Last Admin: 07/26/18 18:26 Dose:  1 drop


Rosuvastatin Calcium (Crestor)  2.5 mg PO HS [facility]


   Last Admin: 07/26/18 21:30 Dose:  2.5 mg


Sitagliptin Phosphate (Januvia)  50 mg PO BID [facility]


   Last Admin: 07/26/18 18:25 Dose:  50 mg











- Labs


Labs: 


 





 07/24/18 10:25 





 07/24/18 10:25 





 











PT  11.5 SECONDS (9.7-12.2)   07/24/18  10:25    


 


INR  1.1   07/24/18  10:25    


 


APTT  27 SECONDS (21-34)   07/24/18  10:25    














- Constitutional


Appears: Non-toxic





- Head Exam


Head Exam: NORMAL INSPECTION





- Eye Exam


Eye Exam: Normal appearance


Pupil Exam: NORMAL ACCOMODATION





- ENT Exam


ENT Exam: Mucous Membranes Moist





- Neck Exam


Neck Exam: Tenderness





- Respiratory Exam


Respiratory Exam: Clear to Ausculation Bilateral





- Cardiovascular Exam


Cardiovascular Exam: REGULAR RHYTHM


Additional comments: 





ischemia no new changes no chest pain





- GI/Abdominal Exam


GI & Abdominal Exam: Normal Bowel Sounds





- Rectal Exam


Rectal Exam: Deferred





-  Exam


 Exam: NORMAL INSPECTION





- Extremities Exam


Extremities Exam: Normal Inspection





- Back Exam


Back Exam: CVA tenderness (L), muscle spasm, vertebral tenderness





- Neurological Exam


Neurological Exam: Alert, Normal Gait, Oriented x3





- Psychiatric Exam


Psychiatric exam: Normal Mood





- Skin


Skin Exam: Normal Color





Assessment and Plan





- Assessment and Plan (Free Text)


Assessment: 





back pain disc herniation ls sopine cervical muscle spasm obesity dm 


Plan: 





discharge today with pt at home cont all med f/u in 2 weeks

## 2018-07-27 NOTE — CP.PCM.PN
Subjective





- Date & Time of Evaluation


Date of Evaluation: 07/27/18


Time of Evaluation: 14:00





- Subjective


Subjective: 





Patient states pain is getting a little better. Family at bedside. 


Denies any weakness/tinglng/numbness/CP/SOB/dizziness/loss of bowel/bladder fx





Advised family of Dr. Caceres recommendations and plan. They agree to plan of 

trial of PT and conservative mgmt and follow up. Advised to return to ED for 

any worsening of sx, numbness/tingling/weakness/loss of bowel bladder. 





Review of Systems





- Review of Systems


All systems: reviewed and no additional remarkable complaints except





- Cardiovascular


Cardiovascular: As Per HPI





- Respiratory


Respiratory: UNREMARKABLE





- Genitourinary


Genitourinary: UNREMARKABLE





- Musculoskeletal


Musculoskeletal: As Par HPI





- Integumentary


Integumentary: UNREMARKABLE





- Neurological


Neurological: As Per HPI





- Hematologic/Lymphatic


Hematologic: UNREMARKABLE





Objective





- Vital Signs/Intake and Output


Vital Signs (last 24 hours): 


 











Temp Pulse Resp BP Pulse Ox


 


 98.2 F   89   20   137/83   96 


 


 07/27/18 07:35  07/27/18 07:35  07/27/18 07:35  07/27/18 07:35  07/27/18 07:35











- Labs


Labs: 


 





 07/24/18 10:25 





 07/24/18 10:25 





 











PT  11.5 SECONDS (9.7-12.2)   07/24/18  10:25    


 


INR  1.1   07/24/18  10:25    


 


APTT  27 SECONDS (21-34)   07/24/18  10:25    














- Constitutional


Appears: Well, No Acute Distress





- Head Exam


Head Exam: ATRAUMATIC





- Neck Exam


Neck Exam: Full ROM, Normal Inspection





- Respiratory Exam


Respiratory Exam: NORMAL BREATHING PATTERN





- Cardiovascular Exam


Additional comments: 





+DP/PT pulses





- Back Exam


Back Exam: NORMAL INSPECTION


Additional comments: 





no vertebral tenderness





- Neurological Exam


Neurological Exam: Alert, Awake


Neuro motor strength exam: Left Lower Extremity: 5, Right Lower Extremity: 5





- Psychiatric Exam


Psychiatric exam: Normal Affect, Normal Mood





- Skin


Skin Exam: Dry, Intact, Normal Color, Warm





Assessment and Plan


(1) Compression fracture of L2


Assessment & Plan: 


patient to f/u Dr. Caceres as outpatient


no ortho intervention indicated, defer treatment to spine specialist


PT/OT 


f/u DR. Ferrer prn


d/w Dr. Ferrer, agrees with above


Status: Acute   





(2) Lumbosacral spondylosis with radiculopathy


Status: Acute   





Radiology Interpretation





- Radiology Interpretation #2


Interpretation: 





Patient Name / ID : ZURI CHRISTIE K  / [id number]


Exam Date : 07/26/2018 14:36:19 ( Approved )


Study Comment : 


Sex / Age : F  / 82Y





Creator : Alana Covarrubias


Dictator : Jennifer Peterson MD


 : 


Approver : Jennifre Peterson MD


Approver2 : 





Report Date : 07/26/2018 14:44:24


My Comment : 


********************************************************************************

***





Date of service: 





07/26/2018





PROCEDURE:  CT Lumbar Spine without contrast





HISTORY:


Old fx L2 with retrolisthesis





COMPARISON:


MRI lumbar spine from 07/25/2018.





TECHNIQUE:


Axial computed tomography images were obtained of the lumbar spine without the 

use of intravenous contrast. Coronal and sagittal reformatted images were 

created and reviewed. 





Radiation dose:





Total exam DLP = 2010.32 mGy-cm.





This CT exam was performed using one or more of the following dose reduction 

techniques: Automated exposure control, adjustment of the mA and/or kV 

according to patient size, and/or use of iterative reconstruction technique.





FINDINGS:





VERTEBRAE:


There is mild dextrocurvature in the lumbar spine. There is diffuse bone 

demineralization. There redemonstration of chronic inferior endplate 

osteoporotic compression fracture deformity in the L2 vertebral body with 

approximately 50 percent loss of anterior vertebral height and left posterior 

inferior retropulsion of fracture fragment. There is normal lumbar lordosis. 

There is no acute fracture or spondylolysis. 





DISCS/SPINAL CANAL/NEURAL FORAMINA:


Evaluation of the discs and nerve roots of spinal canal is limited on 

noncontrast CT examination. Allowing for this: 





L1-2:    No large disc herniation, neural foraminal or spinal canal stenosis.





L2-3:    The retropulsed posterior inferior fragment on the left in conjunction 

with moderate facet arthropathy result in severe left neural foraminal 

narrowing. On the right posterior osteophyte in conjunction with mild facet 

arthropathy contribute to moderate right neural foraminal narrowing. No central 

spinal canal stenosis. 





L3-4:    No large disc herniation, neural foraminal or spinal canal stenosis. 





L4-5:    Diffuse posterior disc bulge in conjunction with mild ligamentum 

flavum infolding result in mild spinal canal stenosis.  Moderate bilateral 

facet arthropathy contribute to mild right and moderate left neural foraminal 

narrowing.





L5-S1:  Diffuse posterior disc bulge with superimposed right posterolateral and 

foraminal disc protrusions in conjunction with severe facet arthropathy result 

in severe right neural foraminal narrowing.  Also noted is mild spinal canal 

stenosis.





PARASPINAL SOFT TISSUES:


There is mild fatty atrophy of the paraspinous muscles. 





OTHER FINDINGS:


None. 





IMPRESSION:


1. Old inferior endplate osteoporotic compression deformity at the L2 vertebral 

body with approximately 50% loss of anterior vertebral height and leftward 

posterior inferior retropulsion of fracture fragment. 





2. Multilevel degenerative disc disease, worse at L2-3 with left sided 

posterior inferior retropulsion of fracture fragment which in conjunction with 

moderate facet arthropathy result in severe left neural foraminal narrowing. On 

the right posterior osteophyte in conjunction with mild facet arthropathy 

contribute to moderate right neural foraminal narrowing. No central spinal 

canal stenosis.





3. Additional comments as described above.

## 2018-11-21 ENCOUNTER — HOSPITAL ENCOUNTER (EMERGENCY)
Dept: HOSPITAL 31 - C.ER | Age: 83
Discharge: HOME | End: 2018-11-21
Payer: COMMERCIAL

## 2018-11-21 VITALS
SYSTOLIC BLOOD PRESSURE: 178 MMHG | HEART RATE: 106 BPM | RESPIRATION RATE: 20 BRPM | OXYGEN SATURATION: 98 % | DIASTOLIC BLOOD PRESSURE: 93 MMHG | TEMPERATURE: 98.3 F

## 2018-11-21 DIAGNOSIS — E78.00: ICD-10-CM

## 2018-11-21 DIAGNOSIS — K59.00: Primary | ICD-10-CM

## 2018-11-21 DIAGNOSIS — M81.0: ICD-10-CM

## 2018-11-21 DIAGNOSIS — I10: ICD-10-CM

## 2018-11-21 NOTE — C.PDOC
History Of Present Illness


83 year old female presents to the ED for evaluation of constipation x 3 days. 

Patient denies fever, chills, nausea, vomiting. 


Chief Complaint (Nursing): GI Problem


History Per: Patient


History/Exam Limitations: no limitations


Onset/Duration Of Symptoms: Days (3)


Current Symptoms Are (Timing): Still Present


Associated Symptoms: Constipation.  denies: Fever, Chills, Nausea, Vomiting


Last Bowel Movement: Days Ago (3)


Additional History Per: Patient


Abnormal Vaginal Bleeding: No





Past Medical History


Reviewed: Historical Data, Nursing Documentation, Vital Signs


Vital Signs: 





                                Last Vital Signs











Temp  98.3 F   11/21/18 20:52


 


Pulse  106 H  11/21/18 20:52


 


Resp  20   11/21/18 20:52


 


BP  178/93 H  11/21/18 20:52


 


Pulse Ox  98   11/21/18 20:52














- Medical History


PMH: HTN, Hypercholesterolemia, Osteoporosis


Surgical History: No Surg Hx


Family History: States: Unknown Family Hx





- Social History


Hx Alcohol Use: No


Hx Substance Use: No





- Immunization History


Hx Tetanus Toxoid Vaccination:  (unk)


Hx Influenza Vaccination: Yes


Hx Pneumococcal Vaccination:  (unk)





Review Of Systems


Constitutional: Negative for: Fever, Chills


Gastrointestinal: Positive for: Constipation.  Negative for: Nausea, Vomiting





Physical Exam





- Physical Exam


Appears: Non-toxic, No Acute Distress


Skin: Normal Color, Warm, Dry


Head: Atraumatic, Normacephalic


Eye(s): bilateral: Normal Inspection


Oral Mucosa: Moist


Neck: Supple


Chest: Symmetrical, No Deformity, No Tenderness


Cardiovascular: Rhythm Regular


Respiratory: Normal Breath Sounds


Gastrointestinal/Abdominal: Soft, No Tenderness, No Guarding, No Rebound


Extremity: Normal ROM


Neurological/Psych: Oriented x3, Normal Speech, Normal Cognition





ED Course And Treatment


O2 Sat by Pulse Oximetry: 98 (on RA)


Pulse Ox Interpretation: Normal


Progress Note: Patient had a bowel movement in the ED, reports instant relief.





Disposition


Counseled Patient/Family Regarding: Diagnosis





- Disposition


Referrals: 


[facility] at [facility] [Outside]


Disposition: HOME/ ROUTINE


Disposition Time: 21:48


Condition: IMPROVED


Prescriptions: 


Docusate Sodium [Colace] 100 mg PO BID #20 capsule


Instructions:  Constipation in Adults, High Fiber Diet


Forms:  [facility] (English)





- POA


Present On Arrival: None





- Clinical Impression


Clinical Impression: 


 Constipation








- PA / NP / Resident Statement


MD/DO has reviewed & agrees with the documentation as recorded.





- Scribe Statement


The provider has reviewed the documentation as recorded by the Scribe (Liya Riggs)


Provider Attestation: 








All medical record entries made by the Scribe were at my direction and 

personally dictated by me. I have reviewed the chart and agree that the record 

accurately reflects my personal performance of the history, physical exam, 

medical decision making, and the department course for this patient. I have also

 personally directed, reviewed, and agree with the discharge instructions and 

disposition.

## 2018-11-21 NOTE — C.PDOC
Chief Complaint (Nursing): GI Problem





Past Medical History


Vital Signs: 





                                Last Vital Signs











Temp  98.3 F   11/21/18 20:52


 


Pulse  106 H  11/21/18 20:52


 


Resp  20   11/21/18 20:52


 


BP  178/93 H  11/21/18 20:52


 


Pulse Ox  98   11/21/18 20:52














- Medical History


PMH: HTN, Hypercholesterolemia, Osteoporosis


Family History: States: Unknown Family Hx





- Social History


Hx Alcohol Use: No


Hx Substance Use: No





- Immunization History


Hx Tetanus Toxoid Vaccination:  (unk)


Hx Influenza Vaccination: Yes


Hx Pneumococcal Vaccination:  (unk)





ED Course And Treatment


O2 Sat by Pulse Oximetry: 98





Disposition





- Disposition


Forms:  [facility] (English)

## 2019-06-09 NOTE — CP.PCM.HP
History of Present Illness





- History of Present Illness


History of Present Illness: 





pt camein to er for back pain and hip pain s/p fall sliped while siting in 

chair fell on her buttok 





Present on Admission





- Present on Admission


Any Indicators Present on Admission: No





Review of Systems





- Review of Systems


Systems not reviewed;Unavailable: Acuity of Condition





- Constitutional


Constitutional: As Per HPI





- EENT


Eyes: As Per HPI


Ears: As Per HPI


Nose/Mouth/Throat: As Per HPI





- Breasts


Breasts: As Per HPI





- Cardiovascular


Cardiovascular: As Per HPI





- Respiratory


Respiratory: As Per HPI





- Genitourinary


Genitourinary: As Per HPI





- Reproductive: Female


Reproductive:Female: As Per HPI





- Menstruation


Menstruation: Post Menopausal





- Musculoskeletal


Musculoskeletal: Back Pain, Limited Range of Motion, Radiating Pain into Limb, 

Other (r hip pain)


Additional comments: 





r hip pain





- Integumentary


Integumentary: As Per HPI





- Neurological


Neurological: Weakness





- Psychiatric


Psychiatric: As Per HPI





- Endocrine


Endocrine: As Per HPI





- Hematologic/Lymphatic


Hematologic: As Per HPI





Past Patient History





- Past Social History


Smoking Status: Never Smoked





- CARDIAC


Hx Hypercholesterolemia: Yes


Hx Hypertension: Yes





- ENDOCRINE/METABOLIC


Hx Diabetes Mellitus Type 2: Yes





- MUSCULOSKELETAL/RHEUMATOLOGICAL


Hx Osteoporosis: Yes





- PSYCHIATRIC


Hx Substance Use: No





- SURGICAL HISTORY


Hx Surgeries: No





- ANESTHESIA


Hx Anesthesia: No





Meds


Allergies/Adverse Reactions: 


 Allergies











Allergy/AdvReac Type Severity Reaction Status Date / Time


 


No Known Allergies Allergy   Verified 07/24/18 08:59














Physical Exam





- Constitutional


Appears: Non-toxic, In Acute Distress





- Head Exam


Head Exam: ATRAUMATIC





- Eye Exam


Eye Exam: Normal appearance


Pupil Exam: NORMAL ACCOMODATION





- ENT Exam


ENT Exam: Mucous Membranes Moist





- Neck Exam


Neck exam: Positive for: Full Rom, Normal Inspection





- Respiratory Exam


Respiratory Exam: Clear to Auscultation Bilateral





- Cardiovascular Exam


Cardiovascular Exam: REGULAR RHYTHM





- GI/Abdominal Exam


GI & Abdominal Exam: Normal Bowel Sounds





- Rectal Exam


Rectal Exam: Deferred





- Extremities Exam


Extremities exam: Positive for: tenderness


Additional comments: 





back and hip





- Neurological Exam


Neurological exam: Alert, Oriented x3





- Psychiatric Exam


Psychiatric exam: Normal Affect





- Skin


Skin Exam: Intact, Normal Color





Results





- Vital Signs


Recent Vital Signs: 





 Last Vital Signs











Temp  98.2 F   07/24/18 08:54


 


Pulse  101 H  07/24/18 08:54


 


Resp  20   07/24/18 08:54


 


BP  140/84   07/24/18 08:54


 


Pulse Ox  97   07/24/18 11:16














- Labs


Result Diagrams: 


 07/24/18 10:25





 07/24/18 10:25


Labs: 





 Laboratory Results - last 24 hr











  07/24/18 07/24/18 07/24/18





  10:25 10:25 10:25


 


WBC  6.6  


 


RBC  4.06  


 


Hgb  11.8  


 


Hct  34.5  


 


MCV  84.9  


 


MCH  29.0  


 


MCHC  34.2  


 


RDW  12.8  


 


Plt Count  173  


 


MPV  9.3  


 


Neut % (Auto)  66.2  


 


Lymph % (Auto)  22.3  


 


Mono % (Auto)  7.8  


 


Eos % (Auto)  2.3  


 


Baso % (Auto)  1.4  


 


Neut # (Auto)  4.4  


 


Lymph # (Auto)  1.5  


 


Mono # (Auto)  0.5  


 


Eos # (Auto)  0.2  


 


Baso # (Auto)  0.1  


 


PT   11.5 


 


INR   1.1 


 


APTT   27 


 


Sodium    139


 


Potassium    4.6


 


Chloride    100


 


Carbon Dioxide    28


 


Anion Gap    15


 


BUN    17


 


Creatinine    0.6 L


 


Est GFR ( Amer)    > 60


 


Est GFR (Non-Af Amer)    > 60


 


Random Glucose    194 H


 


Calcium    9.3


 


Total Bilirubin    0.6


 


AST    29


 


ALT    27


 


Alkaline Phosphatase    84


 


Total Protein    6.8


 


Albumin    3.7


 


Globulin    3.1


 


Albumin/Globulin Ratio    1.2


 


Urine Color   


 


Urine Clarity   


 


Urine pH   


 


Ur Specific Gravity   


 


Urine Protein   


 


Urine Glucose (UA)   


 


Urine Ketones   


 


Urine Blood   


 


Urine Nitrate   


 


Urine Bilirubin   


 


Urine Urobilinogen   


 


Ur Leukocyte Esterase   


 


Urine WBC (Auto)   


 


Urine RBC (Auto)   


 


Ur Squamous Epith Cells   


 


Urine Bacteria   














  07/24/18





  10:51


 


WBC 


 


RBC 


 


Hgb 


 


Hct 


 


MCV 


 


MCH 


 


MCHC 


 


RDW 


 


Plt Count 


 


MPV 


 


Neut % (Auto) 


 


Lymph % (Auto) 


 


Mono % (Auto) 


 


Eos % (Auto) 


 


Baso % (Auto) 


 


Neut # (Auto) 


 


Lymph # (Auto) 


 


Mono # (Auto) 


 


Eos # (Auto) 


 


Baso # (Auto) 


 


PT 


 


INR 


 


APTT 


 


Sodium 


 


Potassium 


 


Chloride 


 


Carbon Dioxide 


 


Anion Gap 


 


BUN 


 


Creatinine 


 


Est GFR ( Amer) 


 


Est GFR (Non-Af Amer) 


 


Random Glucose 


 


Calcium 


 


Total Bilirubin 


 


AST 


 


ALT 


 


Alkaline Phosphatase 


 


Total Protein 


 


Albumin 


 


Globulin 


 


Albumin/Globulin Ratio 


 


Urine Color  Yellow


 


Urine Clarity  Hazy


 


Urine pH  5.0


 


Ur Specific Gravity  1.029


 


Urine Protein  Negative


 


Urine Glucose (UA)  1+


 


Urine Ketones  Negative


 


Urine Blood  Negative


 


Urine Nitrate  Negative


 


Urine Bilirubin  Negative


 


Urine Urobilinogen  Normal


 


Ur Leukocyte Esterase  Trace


 


Urine WBC (Auto)  5


 


Urine RBC (Auto)  1


 


Ur Squamous Epith Cells  8 H


 


Urine Bacteria  Rare














Assessment & Plan





- Assessment and Plan (Free Text)


Assessment: 





acute back pain copresion fx pain hip obessity hx falls 


Plan: 





admit and as per orders





- Date & Time


Date: 07/24/18


Time: 11:45 09-Jun-2019 16:40